# Patient Record
Sex: MALE | Race: BLACK OR AFRICAN AMERICAN | NOT HISPANIC OR LATINO | URBAN - METROPOLITAN AREA
[De-identification: names, ages, dates, MRNs, and addresses within clinical notes are randomized per-mention and may not be internally consistent; named-entity substitution may affect disease eponyms.]

---

## 2023-05-30 ENCOUNTER — OFFICE VISIT (OUTPATIENT)
Dept: FAMILY MEDICINE | Facility: CLINIC | Age: 38
End: 2023-05-30
Payer: COMMERCIAL

## 2023-05-30 VITALS
OXYGEN SATURATION: 95 % | TEMPERATURE: 98.4 F | RESPIRATION RATE: 19 BRPM | SYSTOLIC BLOOD PRESSURE: 114 MMHG | DIASTOLIC BLOOD PRESSURE: 83 MMHG | WEIGHT: 176 LBS | HEART RATE: 108 BPM

## 2023-05-30 DIAGNOSIS — Z87.898 HISTORY OF WHEEZING: ICD-10-CM

## 2023-05-30 DIAGNOSIS — J30.1 SEASONAL ALLERGIC RHINITIS DUE TO POLLEN: Primary | ICD-10-CM

## 2023-05-30 PROCEDURE — 99203 OFFICE O/P NEW LOW 30 MIN: CPT

## 2023-05-30 RX ORDER — FLUTICASONE PROPIONATE 50 MCG
1 SPRAY, SUSPENSION (ML) NASAL DAILY
Qty: 18.2 ML | Refills: 0 | Status: SHIPPED | OUTPATIENT
Start: 2023-05-30 | End: 2023-06-29

## 2023-05-30 RX ORDER — ALBUTEROL SULFATE 90 UG/1
2 AEROSOL, METERED RESPIRATORY (INHALATION) EVERY 6 HOURS PRN
Qty: 18 G | Refills: 0 | Status: SHIPPED | OUTPATIENT
Start: 2023-05-30 | End: 2023-08-17

## 2023-05-30 RX ORDER — CETIRIZINE HYDROCHLORIDE 10 MG/1
10 TABLET ORAL DAILY
Qty: 30 TABLET | Refills: 1 | Status: SHIPPED | OUTPATIENT
Start: 2023-05-30 | End: 2023-06-29

## 2023-05-30 NOTE — LETTER
[address]  [facility] [address]  Phone: [phone]  Fax: [phone]    May 30, 2023        Zuleyka Donahue  [address]   [facility] [address]          To whom it may concern:    RE: Zuleyka Donahue    Patient was seen and treated today at our clinic. Please excuse him today 5/30/23.     Please contact me for questions or concerns.      Sincerely,        Corey Zamorano, DO

## 2023-05-30 NOTE — PROGRESS NOTES
Assessment & Plan     Seasonal allergic rhinitis due to pollen  History of wheezing  37-year-old previously healthy male coming in for itchy eyes, ears, runny nose, sore throat, shortness of breath.  Reports that he has a history of wheezing which was improved with an inhaler several years ago as well as a history of allergies.  Reports recently starting an intranasal medication is not sure its if it was Flonase or not.  His exam is unremarkable and initial tachycardia on exam believe was secondary to patient being moved to room, as his heart rate was in the 80s when I examined him.  Believe that this symptoms are secondary to his allergies and will start Zyrtec and Flonase and recommend he follow-up with his PCP.  I have also refilled his albuterol inhaler for symptomatic relief of him with his history of wheezing but I recommend that he be worked up further for asthma if this continues.  - cetirizine (ZYRTEC) 10 MG tablet; Take 1 tablet (10 mg) by mouth daily  - fluticasone (FLONASE) 50 MCG/ACT nasal spray; Spray 1 spray into both nostrils daily  - albuterol (PROAIR HFA/PROVENTIL HFA/VENTOLIN HFA) 108 (90 Base) MCG/ACT inhaler; Inhale 2 puffs into the lungs every 6 hours as needed for shortness of breath, wheezing or cough    Return if symptoms worsen or fail to improve.    Corey Zamorano DO  [facility] CRISTINA Gardiner is a 37 year old, presenting for the following health issues:  Allergies (X 4 days, Hard to breathe, fever, coughing, chest pains, SOB, nasal congestion, headache. Has not tested for COVID. )    HPI     Acute Illness  Acute illness concerns: Congestion  Onset/Duration: 4 days, worsening  Symptoms:  Fever: YES subjective  Chills/Sweats: YES  Headache (location?): YES  Sinus Pressure: YES  Conjunctivitis:  Itchy eyes  Ear Pain: Itchy ears  Rhinorrhea: YES  Congestion: YES  Sore Throat: YES  Cough: YES  Wheeze: YES  Fatigue/Achiness: YES  Sick/Strep Exposure:  None  Therapies tried and outcome: Nasal spray, allergy medicine without improvement.   Reports that this happened a few years ago and he was given an inhaler which improved his symptoms along with allergy medication.  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    Review of Systems   Constitutional, HEENT, cardiovascular, pulmonary, gi and gu systems are negative, except as otherwise noted.      Objective    /83 (BP Location: Right arm, Patient Position: Sitting, Cuff Size: Adult Regular)   Pulse 108   Temp 98.4  F (36.9  C) (Oral)   Resp 19   Wt 79.8 kg (176 lb)   SpO2 95%   There is no height or weight on file to calculate BMI.  Physical Exam   GENERAL: healthy, alert and no distress  NECK: no adenopathy, no asymmetry, masses, or scars and thyroid normal to palpation  RESP: lungs clear to auscultation - no rales, rhonchi or wheezes  CV: regular rate and rhythm, normal S1 S2, no S3 or S4, no murmur, click or rub, no peripheral edema and peripheral pulses strong  ABDOMEN: soft, nontender, no hepatosplenomegaly, no masses and bowel sounds normal  MS: no gross musculoskeletal defects noted, no edema

## 2023-06-29 DIAGNOSIS — Z87.898 HISTORY OF WHEEZING: ICD-10-CM

## 2023-06-29 DIAGNOSIS — J30.1 SEASONAL ALLERGIC RHINITIS DUE TO POLLEN: ICD-10-CM

## 2023-06-29 RX ORDER — CETIRIZINE HYDROCHLORIDE 10 MG/1
10 TABLET ORAL DAILY
Qty: 90 TABLET | Refills: 3 | Status: SHIPPED | OUTPATIENT
Start: 2023-06-29 | End: 2023-08-17

## 2023-06-29 RX ORDER — FLUTICASONE PROPIONATE 50 MCG
1 SPRAY, SUSPENSION (ML) NASAL DAILY
Qty: 18.2 ML | Refills: 4 | Status: SHIPPED | OUTPATIENT
Start: 2023-06-29 | End: 2023-08-17

## 2023-06-29 NOTE — TELEPHONE ENCOUNTER
Pharmacy requests refill on both medication cetrizine HCL 10mg and Fluticasone 50mcg but pt was seen by you at different clinic. So please see and advice.     Thank!  Ma

## 2023-08-17 ENCOUNTER — OFFICE VISIT (OUTPATIENT)
Dept: FAMILY MEDICINE | Facility: CLINIC | Age: 38
End: 2023-08-17
Payer: COMMERCIAL

## 2023-08-17 VITALS
OXYGEN SATURATION: 97 % | RESPIRATION RATE: 16 BRPM | SYSTOLIC BLOOD PRESSURE: 112 MMHG | DIASTOLIC BLOOD PRESSURE: 73 MMHG | TEMPERATURE: 97.5 F | HEART RATE: 56 BPM | WEIGHT: 168.1 LBS

## 2023-08-17 DIAGNOSIS — R21 RASH AND NONSPECIFIC SKIN ERUPTION: Primary | ICD-10-CM

## 2023-08-17 DIAGNOSIS — Z00.00 HEALTH MAINTENANCE EXAMINATION: ICD-10-CM

## 2023-08-17 PROCEDURE — 99213 OFFICE O/P EST LOW 20 MIN: CPT | Performed by: FAMILY MEDICINE

## 2023-08-17 RX ORDER — TRIAMCINOLONE ACETONIDE 1 MG/G
CREAM TOPICAL 2 TIMES DAILY
Qty: 30 G | Refills: 0 | Status: SHIPPED | OUTPATIENT
Start: 2023-08-17

## 2023-08-17 NOTE — PROGRESS NOTES
Clinical Decision Making:    At the end of the encounter, I discussed results, diagnosis, medications. Discussed red flags for immediate return to clinic/ER, as well as indications for follow up if no improvement. Patient understood and agreed to plan. Patient was stable for discharge.      ICD-10-CM    1. Rash and nonspecific skin eruption  R21 triamcinolone (KENALOG) 0.1 % external cream      2. Health maintenance examination  Z00.00 Primary Care Referral        Nonspecific rash  Could be a contact dermatitis versus pityriasis rosea  Treating with triamcinolone 0.1% cream twice daily  Follow-up if not improving as anticipated    Placed a referral for primary care as patient would like to establish here      Patient Instructions   Use the cream twice daily for up to 2 weeks  Follow up if not improving as anticipated.       No follow-ups on file.      chief complaint    HPI:  Zuleyka Donahue is a 37 year old male who presents today complaining of rash on his neck for 3 to 4 days but he has had a rash on other parts of his body for about a week  He first noticed 1 on the left flank but that has gone away and now there is an itchy rash on the back of his neck.  He has been in a [place] recently  Has had no fevers  He works outside    History obtained from the patient.    Problem List:  There are no relevant problems documented for this patient.      No past medical history on file.    Social History     Tobacco Use    Smoking status: Former     Types: Cigarettes    Smokeless tobacco: Never   Substance Use Topics    Alcohol use: Not on file       Review of systems  negative except listed in HPI    Vitals:    08/17/23 1608   BP: 112/73   Pulse: 56   Resp: 16   Temp: 97.5  F (36.4  C)   TempSrc: Oral   SpO2: 97%   Weight: 76.2 kg (168 lb 1.6 oz)       Physical Exam  Vitals noted and within normal limits  On the right side of his neck there is a patch of raised erythematous area which appears to be a collection of  papules.  He also has 1 very small area on the back of his left hand, and some healed patches on his flanks.